# Patient Record
Sex: FEMALE | Race: BLACK OR AFRICAN AMERICAN | NOT HISPANIC OR LATINO | Employment: FULL TIME | URBAN - METROPOLITAN AREA
[De-identification: names, ages, dates, MRNs, and addresses within clinical notes are randomized per-mention and may not be internally consistent; named-entity substitution may affect disease eponyms.]

---

## 2020-02-17 PROBLEM — I10 ESSENTIAL HYPERTENSION: Status: ACTIVE | Noted: 2020-02-17

## 2020-02-17 PROBLEM — E66.01 CLASS 3 SEVERE OBESITY WITH BODY MASS INDEX (BMI) OF 40.0 TO 44.9 IN ADULT: Status: ACTIVE | Noted: 2020-02-17

## 2020-08-17 PROBLEM — Z80.0 FAMILY HISTORY OF PANCREATIC CANCER: Status: ACTIVE | Noted: 2020-08-17

## 2020-08-17 PROBLEM — Z80.3 FAMILY HISTORY OF BREAST CANCER: Status: ACTIVE | Noted: 2020-08-17

## 2020-10-02 ENCOUNTER — SOCIAL WORK (OUTPATIENT)
Dept: ADMINISTRATIVE | Facility: OTHER | Age: 40
End: 2020-10-02

## 2020-10-02 NOTE — PROGRESS NOTES
SW met with pt regarding initial OB assessment. Pt stated this is her 2nd pregnancy/0-miscarriage/1-ectopic. Pt stated lives with her  and able to perform ADL's independently. Pt stated support system is her /[employer]. Pt stated does not have medicaid. Pt informed to go down stair to the financial assistance office and apply for medicaid Pt stated does not have WIC. SW provide pt with information on other community resources.SW scanned pt's notification of pregnancy into epic.  No other needs identified at this time.    Germania Morales,MSW  Pager#[phone]

## 2020-10-05 PROBLEM — O10.919 CHRONIC HYPERTENSION IN PREGNANCY: Status: ACTIVE | Noted: 2020-10-05

## 2020-10-05 PROBLEM — O09.91 SUPERVISION OF HIGH RISK PREGNANCY IN FIRST TRIMESTER: Status: ACTIVE | Noted: 2020-10-05

## 2020-12-07 PROBLEM — O09.92 SUPERVISION OF HIGH RISK PREGNANCY IN SECOND TRIMESTER: Status: ACTIVE | Noted: 2020-10-05

## 2021-03-01 PROBLEM — O09.93 SUPERVISION OF HIGH RISK PREGNANCY IN THIRD TRIMESTER: Status: ACTIVE | Noted: 2020-10-05

## 2021-04-23 PROBLEM — O10.919 CHRONIC HYPERTENSION AFFECTING PREGNANCY: Status: ACTIVE | Noted: 2021-04-23

## 2021-04-23 PROBLEM — O11.9 CHRONIC HYPERTENSION WITH SUPERIMPOSED PRE-ECLAMPSIA: Status: ACTIVE | Noted: 2021-04-23

## 2021-04-26 PROBLEM — O61.0 FAILED MEDICAL INDUCTION OF LABOR: Status: ACTIVE | Noted: 2021-04-26

## 2021-04-26 PROBLEM — O99.213 OBESITY AFFECTING PREGNANCY IN THIRD TRIMESTER: Status: ACTIVE | Noted: 2021-04-26

## 2021-04-26 PROBLEM — Z98.891 S/P CESAREAN SECTION: Status: ACTIVE | Noted: 2021-04-26

## 2021-04-26 PROBLEM — Z30.430 ENCOUNTER FOR INSERTION OF MIRENA IUD: Status: ACTIVE | Noted: 2021-04-26

## 2021-05-10 PROBLEM — O11.9 CHRONIC HYPERTENSION WITH SUPERIMPOSED PRE-ECLAMPSIA: Status: RESOLVED | Noted: 2021-04-23 | Resolved: 2021-05-10

## 2021-05-10 PROBLEM — O61.0 FAILED MEDICAL INDUCTION OF LABOR: Status: RESOLVED | Noted: 2021-04-26 | Resolved: 2021-05-10

## 2021-05-10 PROBLEM — O99.213 OBESITY AFFECTING PREGNANCY IN THIRD TRIMESTER: Status: RESOLVED | Noted: 2021-04-26 | Resolved: 2021-05-10

## 2021-05-10 PROBLEM — O09.93 SUPERVISION OF HIGH RISK PREGNANCY IN THIRD TRIMESTER: Status: RESOLVED | Noted: 2020-10-05 | Resolved: 2021-05-10

## 2021-09-08 ENCOUNTER — PATIENT OUTREACH (OUTPATIENT)
Dept: ADMINISTRATIVE | Facility: HOSPITAL | Age: 41
End: 2021-09-08

## 2021-09-08 DIAGNOSIS — Z12.31 SCREENING MAMMOGRAM, ENCOUNTER FOR: Primary | ICD-10-CM

## 2021-10-27 PROBLEM — Z98.891 S/P CESAREAN SECTION: Status: RESOLVED | Noted: 2021-04-26 | Resolved: 2021-10-27

## 2021-10-27 PROBLEM — O10.919 CHRONIC HYPERTENSION IN PREGNANCY: Status: RESOLVED | Noted: 2020-10-05 | Resolved: 2021-10-27

## 2023-01-24 ENCOUNTER — PATIENT MESSAGE (OUTPATIENT)
Dept: ADMINISTRATIVE | Facility: HOSPITAL | Age: 43
End: 2023-01-24

## 2023-01-24 ENCOUNTER — PATIENT OUTREACH (OUTPATIENT)
Dept: ADMINISTRATIVE | Facility: HOSPITAL | Age: 43
End: 2023-01-24